# Patient Record
Sex: MALE | Race: OTHER | Employment: FULL TIME | URBAN - METROPOLITAN AREA
[De-identification: names, ages, dates, MRNs, and addresses within clinical notes are randomized per-mention and may not be internally consistent; named-entity substitution may affect disease eponyms.]

---

## 2021-10-05 ENCOUNTER — HOSPITAL ENCOUNTER (EMERGENCY)
Age: 21
Discharge: HOME | End: 2021-10-05
Attending: EMERGENCY MEDICINE

## 2021-10-05 VITALS
TEMPERATURE: 98 F | RESPIRATION RATE: 16 BRPM | HEART RATE: 87 BPM | WEIGHT: 185.19 LBS | OXYGEN SATURATION: 100 % | SYSTOLIC BLOOD PRESSURE: 137 MMHG | DIASTOLIC BLOOD PRESSURE: 74 MMHG | BODY MASS INDEX: 28.07 KG/M2 | HEIGHT: 68 IN

## 2021-10-05 DIAGNOSIS — L60.0 INGROWING NAIL WITH INFECTION: Primary | ICD-10-CM

## 2021-10-05 PROCEDURE — 99282 EMERGENCY DEPT VISIT SF MDM: CPT

## 2021-10-05 RX ORDER — CEPHALEXIN 500 MG/1
500 CAPSULE ORAL 4 TIMES DAILY
Qty: 40 CAPSULE | Refills: 0 | Status: SHIPPED | OUTPATIENT
Start: 2021-10-05 | End: 2021-10-15

## 2021-10-05 NOTE — LETTER
[facility] [place]  [facility] EMERGENCY DEPT  [address]  [facility] [id number] [phone]    Work/School Note    Date: 10/5/2021    To Whom It May concern:    Farrah Hazel was seen and treated today in the emergency room by the following provider(s):  Attending Provider: Nereida Lira MD  Physician Assistant: Carlos Eduardo Curtis PA-C. Farrah Hazel may return to work on 10/08/2021.     Sincerely,          Cintia Andre PA-C

## 2021-10-05 NOTE — ED PROVIDER NOTES
EMERGENCY DEPARTMENT HISTORY AND PHYSICAL EXAM    Date: 10/5/2021  Patient Name: Juan Pablo Bruce    History of Presenting Illness     Chief Complaint   Patient presents with    Toe Pain      used for this encounter as patient is non-english speaker      History Provided By: Patient    Chief Complaint: toe pain    HPI(Context):   1:46 PM  Juan Pablo Bruce is a 19 y.o. male with no PMH who presents to the emergency department C/O toe nails. Associated sxs include bilateral ingrown great toenails. L>>R. Pain x 2 weeks. Pt notes over last week he noticied swelling to side of toe. Pt notes \"crusting\" to area. Pt denies trauma, hx of DMII, and any other sxs or complaints. PCP: None        Past History     Past Medical History:  No past medical history on file. Past Surgical History:  No past surgical history on file. Family History:  No family history on file. Social History:  Social History     Tobacco Use    Smoking status: Former Smoker   Substance Use Topics    Alcohol use: Yes     Comment: once a month    Drug use: Never       Allergies:  No Known Allergies      Review of Systems   Review of Systems   Musculoskeletal: Positive for arthralgias and joint swelling. Skin: Positive for color change. Allergic/Immunologic: Negative for immunocompromised state. Neurological: Negative for weakness and numbness. All other systems reviewed and are negative. Physical Exam     Vitals:    10/05/21 1340   BP: 137/74   Pulse: 87   Resp: 16   Temp: 98 °F (36.7 °C)   SpO2: 100%   Weight: 84 kg (185 lb 3 oz)   Height: 5' 8\" (1.727 m)     Physical Exam  Vitals and nursing note reviewed. Constitutional:       General: He is not in acute distress. Appearance: He is well-developed. He is not diaphoretic. Comments:  male in NAD. Alert. Appears comfortable. HENT:      Head: Normocephalic and atraumatic.       Right Ear: External ear normal.      Left Ear: External ear normal.      Nose: Nose normal.   Eyes:      General: No scleral icterus. Right eye: No discharge. Left eye: No discharge. Conjunctiva/sclera: Conjunctivae normal.   Cardiovascular:      Rate and Rhythm: Normal rate and regular rhythm. Pulses:           Radial pulses are 2+ on the right side and 2+ on the left side. Heart sounds: Normal heart sounds. No murmur heard. No friction rub. No gallop. Pulmonary:      Effort: Pulmonary effort is normal. No tachypnea, accessory muscle usage or respiratory distress. Breath sounds: Normal breath sounds. No decreased breath sounds, wheezing, rhonchi or rales. Musculoskeletal:         General: Normal range of motion. Cervical back: Normal range of motion. Feet:    Skin:     General: Skin is warm and dry. Neurological:      Mental Status: He is alert and oriented to person, place, and time. Psychiatric:         Judgment: Judgment normal.             Diagnostic Study Results     Labs -   No results found for this or any previous visit (from the past 12 hour(s)). No orders to display     CT Results  (Last 48 hours)    None        CXR Results  (Last 48 hours)    None          Medications given in the ED-  Medications - No data to display      Medical Decision Making   I am the first provider for this patient. I reviewed the vital signs, available nursing notes, past medical history, past surgical history, family history and social history. Vital Signs-Reviewed the patient's vital signs. Pulse Oximetry Analysis - 100% on RA. NORMAL     Records Reviewed: Nursing Notes    Provider Notes (Medical Decision Making): ingrown nail infection. No abscess. Will tx with ABX. Warm water soaks. FU with podiatry for definitive care. Procedures:  Procedures    ED Course:   1:46 PM Initial assessment performed.  The patients presenting problems have been discussed, and they are in agreement with the care plan formulated and outlined with them. I have encouraged them to ask questions as they arise throughout their visit. Diagnosis and Disposition       See MDM above. Reasons to RTED discussed with pt. All questions answered. Pt feels comfortable going home at this time. Pt expressed understanding and she agrees with plan. 1. Ingrowing nail with infection        PLAN:  1. D/C Home  2. Discharge Medication List as of 10/5/2021  2:30 PM        3. Follow-up Information     Follow up With Specialties Details Why Contact Farnaz Bermudez, GEORGINA Podiatry  [facility]. [facility]  [address] [address]  [phone]      [facility] EMERGENCY DEPT Emergency Medicine   [address] [place] [address] bypass [phone]        _______________________________    Attestations: This note is prepared by David Lugo PA-C.  _______________________________          Please note that this dictation was completed with [employer], the computer voice recognition software. Quite often unanticipated grammatical, syntax, homophones, and other interpretive errors are inadvertently transcribed by the computer software. Please disregard these errors. Please excuse any errors that have escaped final proofreading.